# Patient Record
Sex: MALE | URBAN - METROPOLITAN AREA
[De-identification: names, ages, dates, MRNs, and addresses within clinical notes are randomized per-mention and may not be internally consistent; named-entity substitution may affect disease eponyms.]

---

## 2020-09-03 ENCOUNTER — HOSPITAL ENCOUNTER (OUTPATIENT)
Age: 75
Setting detail: SPECIMEN
Discharge: HOME | End: 2020-09-03
Payer: COMMERCIAL

## 2020-09-10 LAB — SURGICAL PATHOLOGY REPORT: NORMAL

## 2024-03-10 ENCOUNTER — HOSPITAL ENCOUNTER (EMERGENCY)
Facility: CLINIC | Age: 79
Discharge: HOME | End: 2024-03-10
Attending: EMERGENCY MEDICINE
Payer: COMMERCIAL

## 2024-03-10 ENCOUNTER — APPOINTMENT (OUTPATIENT)
Dept: GENERAL RADIOLOGY | Facility: CLINIC | Age: 79
End: 2024-03-10
Attending: EMERGENCY MEDICINE
Payer: COMMERCIAL

## 2024-03-10 VITALS
OXYGEN SATURATION: 95 % | RESPIRATION RATE: 16 BRPM | HEART RATE: 79 BPM | HEIGHT: 68 IN | WEIGHT: 185 LBS | TEMPERATURE: 97.6 F | BODY MASS INDEX: 28.04 KG/M2 | SYSTOLIC BLOOD PRESSURE: 150 MMHG | DIASTOLIC BLOOD PRESSURE: 83 MMHG

## 2024-03-10 DIAGNOSIS — L03.90 CELLULITIS, UNSPECIFIED CELLULITIS SITE: Primary | ICD-10-CM

## 2024-03-10 PROCEDURE — 99283 EMERGENCY DEPT VISIT LOW MDM: CPT

## 2024-03-10 PROCEDURE — 73562 X-RAY EXAM OF KNEE 3: CPT

## 2024-03-10 RX ORDER — CEPHALEXIN 500 MG/1
500 CAPSULE ORAL 2 TIMES DAILY
Qty: 14 CAPSULE | Refills: 0 | Status: SHIPPED | OUTPATIENT
Start: 2024-03-10 | End: 2024-03-17

## 2024-03-10 NOTE — DISCHARGE INSTRUCTIONS
Call your family to arrange close follow-up and reevaluation in 2 to 3 days.  Start Keflex.  Return if problems  Take Tylenol for pain

## 2024-03-10 NOTE — ED PROVIDER NOTES
[facility]  [address] [id number]  Phone: [phone]        [facility] ED  EMERGENCY DEPARTMENT ENCOUNTER      Pt Name: Jamari Cochran  MRN: [id number]  Birthdate 1945  Date of evaluation: 3/10/2024  Provider: Racheal Diop MD    CHIEF COMPLAINT       Chief Complaint   Patient presents with    Leg Pain         HISTORY OF PRESENT ILLNESS   (Location/Symptom, Timing/Onset,Context/Setting, Quality, Duration, Modifying Factors, Severity)  Note limiting factors.   Jamari Cochran is a 78 y.o. male who presents to the emergency department with pain in right knee patellar aspect.  He states that it started 5 days ago got better and then Friday he noticed that the area of the patellar region was red and tender.  Had no fevers chills nausea vomiting trauma or fall.  Not been kneeling for prolonged periods of time.  He denies any hip pain    He is diabetic on oral medications he does not smoke he has no history of MI or stroke and is not on blood thinners.  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    Nursing Notes were reviewed.    REVIEW OF SYSTEMS    (2-9systems for level 4, 10 or more for level 5)     Review of Systems   Constitutional: Negative.    HENT: Negative.     Respiratory: Negative.     Cardiovascular: Negative.    Gastrointestinal: Negative.    Genitourinary: Negative.    Musculoskeletal:         Knee pain, redness and tenderness   Skin: Negative.    Neurological: Negative.    Psychiatric/Behavioral: Negative.         Except asnoted above the remainder of the review of systems was reviewed and negative.       PAST MEDICAL HISTORY   History reviewed. No pertinent past medical history.      SURGICAL HISTORY     History reviewed. No pertinent surgical history.      CURRENT MEDICATIONS     Discharge Medication List as of 3/10/2024 11:54 AM          ALLERGIES     Patient has no known allergies.    FAMILY HISTORY     History reviewed. No pertinent family history.

## 2024-03-10 NOTE — DISCHARGE INSTR - COC
Labs or Other Treatments After Discharge: ***    Physician Certification: I certify the above information and transfer of Zaid Gorski  is necessary for the continuing treatment of the diagnosis listed and that he requires {Admit to Appropriate Level of Care:30280} for {GREATER/LESS:746856083} 30 days.     Update Admission H&P: {CHP DME Changes in HandP:853460261}    PHYSICIAN SIGNATURE:  {Esignature:[id number]}